# Patient Record
Sex: MALE | Race: WHITE | NOT HISPANIC OR LATINO | Employment: FULL TIME | URBAN - METROPOLITAN AREA
[De-identification: names, ages, dates, MRNs, and addresses within clinical notes are randomized per-mention and may not be internally consistent; named-entity substitution may affect disease eponyms.]

---

## 2019-09-06 ENCOUNTER — NON-PROVIDER VISIT (OUTPATIENT)
Dept: URGENT CARE | Facility: MEDICAL CENTER | Age: 29
End: 2019-09-06

## 2019-09-06 DIAGNOSIS — Z02.1 PRE-EMPLOYMENT DRUG SCREENING: ICD-10-CM

## 2019-09-06 LAB
AMP AMPHETAMINE: NORMAL
BAR BARBITURATES: NORMAL
BZO BENZODIAZEPINES: NORMAL
COC COCAINE: NORMAL
INT CON NEG: NORMAL
INT CON POS: NORMAL
MDMA ECSTASY: NORMAL
MET METHAMPHETAMINES: NORMAL
MTD METHADONE: NORMAL
OPI OPIATES: NORMAL
OXY OXYCODONE: NORMAL
PCP PHENCYCLIDINE: NORMAL
POC URINE DRUG SCREEN OCDRS: NORMAL
THC: NORMAL

## 2019-09-06 PROCEDURE — 80305 DRUG TEST PRSMV DIR OPT OBS: CPT | Performed by: PHYSICIAN ASSISTANT

## 2021-02-20 ENCOUNTER — HOSPITAL ENCOUNTER (EMERGENCY)
Facility: MEDICAL CENTER | Age: 31
End: 2021-02-21
Attending: EMERGENCY MEDICINE
Payer: COMMERCIAL

## 2021-02-20 DIAGNOSIS — K04.7 DENTAL INFECTION: ICD-10-CM

## 2021-02-20 DIAGNOSIS — K08.89 PAIN, DENTAL: ICD-10-CM

## 2021-02-20 PROCEDURE — 64400 NJX AA&/STRD TRIGEMINAL NRV: CPT

## 2021-02-20 PROCEDURE — 700102 HCHG RX REV CODE 250 W/ 637 OVERRIDE(OP): Performed by: EMERGENCY MEDICINE

## 2021-02-20 PROCEDURE — A9270 NON-COVERED ITEM OR SERVICE: HCPCS | Performed by: EMERGENCY MEDICINE

## 2021-02-20 PROCEDURE — 303977 HCHG I & D

## 2021-02-20 PROCEDURE — 99283 EMERGENCY DEPT VISIT LOW MDM: CPT

## 2021-02-20 RX ORDER — AMOXICILLIN 500 MG/1
500 CAPSULE ORAL 3 TIMES DAILY
Qty: 21 CAPSULE | Refills: 0 | Status: SHIPPED | OUTPATIENT
Start: 2021-02-20 | End: 2021-02-27

## 2021-02-20 RX ORDER — BUPIVACAINE HYDROCHLORIDE AND EPINEPHRINE 5; 5 MG/ML; UG/ML
10 INJECTION, SOLUTION EPIDURAL; INTRACAUDAL; PERINEURAL ONCE
Status: COMPLETED | OUTPATIENT
Start: 2021-02-20 | End: 2021-02-21

## 2021-02-20 RX ORDER — AMOXICILLIN 500 MG/1
500 CAPSULE ORAL ONCE
Status: COMPLETED | OUTPATIENT
Start: 2021-02-20 | End: 2021-02-20

## 2021-02-20 RX ADMIN — AMOXICILLIN 500 MG: 500 CAPSULE ORAL at 23:43

## 2021-02-21 ENCOUNTER — HOSPITAL ENCOUNTER (EMERGENCY)
Facility: MEDICAL CENTER | Age: 31
End: 2021-02-21
Attending: EMERGENCY MEDICINE
Payer: COMMERCIAL

## 2021-02-21 VITALS
HEIGHT: 75 IN | HEART RATE: 79 BPM | OXYGEN SATURATION: 99 % | DIASTOLIC BLOOD PRESSURE: 79 MMHG | WEIGHT: 156.53 LBS | RESPIRATION RATE: 16 BRPM | BODY MASS INDEX: 19.46 KG/M2 | TEMPERATURE: 98 F | SYSTOLIC BLOOD PRESSURE: 134 MMHG

## 2021-02-21 VITALS
RESPIRATION RATE: 16 BRPM | OXYGEN SATURATION: 97 % | DIASTOLIC BLOOD PRESSURE: 67 MMHG | WEIGHT: 156.53 LBS | HEART RATE: 83 BPM | BODY MASS INDEX: 19.46 KG/M2 | HEIGHT: 75 IN | SYSTOLIC BLOOD PRESSURE: 130 MMHG | TEMPERATURE: 97.6 F

## 2021-02-21 DIAGNOSIS — K02.9 INFECTED DENTAL CARIES: ICD-10-CM

## 2021-02-21 DIAGNOSIS — K04.7 DENTAL ABSCESS: ICD-10-CM

## 2021-02-21 DIAGNOSIS — K04.7 INFECTED DENTAL CARIES: ICD-10-CM

## 2021-02-21 PROCEDURE — 96372 THER/PROPH/DIAG INJ SC/IM: CPT

## 2021-02-21 PROCEDURE — A9270 NON-COVERED ITEM OR SERVICE: HCPCS | Performed by: EMERGENCY MEDICINE

## 2021-02-21 PROCEDURE — 700102 HCHG RX REV CODE 250 W/ 637 OVERRIDE(OP): Performed by: EMERGENCY MEDICINE

## 2021-02-21 PROCEDURE — 99283 EMERGENCY DEPT VISIT LOW MDM: CPT

## 2021-02-21 PROCEDURE — 700111 HCHG RX REV CODE 636 W/ 250 OVERRIDE (IP): Performed by: EMERGENCY MEDICINE

## 2021-02-21 PROCEDURE — 700101 HCHG RX REV CODE 250: Performed by: EMERGENCY MEDICINE

## 2021-02-21 RX ORDER — IBUPROFEN 600 MG/1
600 TABLET ORAL EVERY 6 HOURS PRN
Qty: 20 TABLET | Refills: 0 | Status: SHIPPED | OUTPATIENT
Start: 2021-02-21

## 2021-02-21 RX ORDER — AMOXICILLIN AND CLAVULANATE POTASSIUM 875; 125 MG/1; MG/1
1 TABLET, FILM COATED ORAL ONCE
Status: COMPLETED | OUTPATIENT
Start: 2021-02-21 | End: 2021-02-21

## 2021-02-21 RX ORDER — KETOROLAC TROMETHAMINE 30 MG/ML
30 INJECTION, SOLUTION INTRAMUSCULAR; INTRAVENOUS ONCE
Status: COMPLETED | OUTPATIENT
Start: 2021-02-21 | End: 2021-02-21

## 2021-02-21 RX ORDER — AMOXICILLIN AND CLAVULANATE POTASSIUM 875; 125 MG/1; MG/1
1 TABLET, FILM COATED ORAL 2 TIMES DAILY
Qty: 14 TABLET | Refills: 0 | Status: SHIPPED | OUTPATIENT
Start: 2021-02-21 | End: 2021-02-28

## 2021-02-21 RX ADMIN — AMOXICILLIN AND CLAVULANATE POTASSIUM 1 TABLET: 875; 125 TABLET, FILM COATED ORAL at 07:58

## 2021-02-21 RX ADMIN — BUPIVACAINE HYDROCHLORIDE AND EPINEPHRINE 10 ML: 5; 5 INJECTION, SOLUTION EPIDURAL; INTRACAUDAL; PERINEURAL at 00:17

## 2021-02-21 RX ADMIN — KETOROLAC TROMETHAMINE 30 MG: 30 INJECTION, SOLUTION INTRAMUSCULAR; INTRAVENOUS at 07:58

## 2021-02-21 NOTE — DISCHARGE INSTRUCTIONS
You were seen in the emergency department for dental pain.  There is concern for possible infection and you are being started on an antibiotic.  You have also been given an injection of numbing medicine that should numb your mouth for 12 to 24 hours.    For pain you can take ibuprofen (Motrin), 600mg every 6 hours as needed for pain (take with food to avoid GI upset). You can also take acetaminophen (Tylenol), 1000mg every 8 hours as needed for pain. Do not take more than 3000mg of acetaminophen in any 24 hour period.  Taking these medications regularly during the day can be very effective in controlling pain.    Please follow-up with your dentist.    Please return to the emergency department or seek medical attention if you develop:  Difficulty swallowing, difficulty breathing, fevers, any other new or concerning findings

## 2021-02-21 NOTE — DISCHARGE INSTRUCTIONS
Follow-up with dental/oral surgery as scheduled Monday and/or Tuesday for reevaluation and definitive treatment.  Above is an additional referral option, call Dr. Serrano's office tomorrow morning, references emergency department visit and schedule appointment for follow-up.    Stop taking amoxicillin.  Start Augmentin twice daily for 7 days or until seen by dentist.  Motrin every 6 hours as needed for swelling or discomfort.  Encourage around-the-clock use for at least 48 hours.  You may add Tylenol every 4-6 hours as needed for breakthrough pain.    Room temperature soft diet as tolerated.    Return to the emergency department for intractable pain, worsening facial swelling or redness, difficulty swallowing or breathing, voice change, neck pain or stiffness, fever, vomiting or other new concerns.

## 2021-02-21 NOTE — ED NOTES
Patient walked with a steady gate at this time to [facility] room 36. Offered him a blanket (declined at this time) and call light. Ready to be seen  rn at bedside

## 2021-02-21 NOTE — ED TRIAGE NOTES
Ambulatory to triage with report of  Chief Complaint   Patient presents with   • Dental Injury     right lower jaw, broken tooth.  has been broken for a while, pain and swelling started yesterday.  reports no relief with ibuprofen.  has appt with dentist on Tuesday   no drooling noted in triage, voice slightly garbled, slight swelling noted to right lower jaw.  Denies fever

## 2021-02-21 NOTE — ED PROVIDER NOTES
ED Provider Note    Scribed for Marcelino Kelley M.D. by Ting Antunez. 2/20/2021, 11:13 PM.    Means of Arrival: Walk-in  History obtained from: Patient  History limited by: None    CHIEF COMPLAINT  Chief Complaint   Patient presents with    Dental Injury     right lower jaw, broken tooth.  has been broken for a while, pain and swelling started yesterday.  reports no relief with ibuprofen.  has appt with dentist on Tuesday     HPI  Leonard Rojas is a 30 y.o. male who presents to the Emergency Department for evaluation of right lower jaw pain. The patient reports having a broken tooth in the right lower jaw that required filling. Patient reports the filling fell out of the tooth three years ago. He has been able to relieve the intermittent pain with Ibuprofen since then. However, the patient reports no relief with Ibuprofen with the pain started yesterday. He states the pain is exacerbated with eating. The patient has an appointment scheduled with a dentist in three days but is requesting pain relief in the meantime. Denies fevers. The patient has no allergies to medications. Denies having any chronic medical problems.    REVIEW OF SYSTEMS  HENT: Right lower jaw pain.     See HPI for further details.     PAST MEDICAL HISTORY  History reviewed. No pertinent past medical history.    FAMILY HISTORY  History reviewed. No pertinent family history.    SOCIAL HISTORY   reports that he has been smoking cigarettes. He has been smoking about 1.00 pack per day. He has never used smokeless tobacco. He reports that he does not drink alcohol and does not use drugs.    SURGICAL HISTORY  History reviewed. No pertinent surgical history.    CURRENT MEDICATIONS  Home Medications       Reviewed by Hui Fields R.N. (Registered Nurse) on 02/20/21 at 8181  Med List Status: <None>     Medication Last Dose Status        Patient Mark Taking any Medications                         ALLERGIES  No Known  "Allergies    PHYSICAL EXAM  VITAL SIGNS: /75   Pulse 70   Temp 36.2 °C (97.2 °F) (Temporal)   Resp 14   Ht 1.905 m (6' 3\")   Wt 71 kg (156 lb 8.4 oz)   SpO2 98%   BMI 19.56 kg/m²    Gen: alert, no acute distress  HENT: ATNC. Tooth 30 is broken. Swelling adjacent to the tooth with erythema. Poor dentition of tooth 30. No obvious discharge. Soft sublingual space. No stridor. Patient is managing secretions well.   Eyes: normal conjuctiva.  Resp: No resipiratory distress.   CV: No JVD.  Abd: Non-distended.  Extremities: No deformity.    PROCEDURES     Inferior Alveolar Block:  11:26 PM performed by Dr. Kelley  Sterile process was used  Indication: dental pain  Consent: verbal from the patient  Location: tooth 30  Anesthesia: 0.5% bupivacaine with epi 3 cc's  Toleration: the patient tolerated well, no immediate complications or bleeding  Total procedure time: 7 minutes    COURSE & MEDICAL DECISION MAKING  Pertinent Labs & Imaging studies reviewed. (See chart for details)    11:13 PM - Patient evaluated at bedside. Patient will be medicated with Amoxicillin 500 mg capsule and Bupivacaine 0.5 % 10 mL injection for his symptoms.    11:50 PM - I performed an inferior alveolar block at this time.     12:04 AM - Patient reevaluated at bedside. Attempted incision and drainage of the abscess, but patient could not tolerate the procedure at this time.     12:10 AM - Instructed the patient to use a combination of Tylenol and Motrin for the pain at home once the Bupivacaine injection wears off. Patient was given an opportunity to ask questions at this time.      Medical Decision Making:  Patient presents with dental pain and swelling from a previously injured tooth.  He was treated with a dental block, and I&D was attempted, however patient did not tolerate this.  He preferred to proceed with antibiotics only and not repeat attempted I&D.  He was counseled on nonopioid pain medications, and has a dental follow-up " arranged.  No evidence of facial cellulitis, Lemierre's syndrome, Niraj angina.    I wore a mask and eye protection throughout the encounter.    The patient will return for new or worsening symptoms and is stable at the time of discharge.    The patient is referred to a primary physician for blood pressure management, diabetic screening, and for all other preventative health concerns.    DISPOSITION:  Patient will be discharged home in stable condition.    FOLLOW UP:  Your dentist    In 3 days    OUTPATIENT MEDICATIONS:  New Prescriptions    AMOXICILLIN (AMOXIL) 500 MG CAP    Take 1 capsule by mouth 3 times a day for 7 days.     FINAL IMPRESSION  1. Pain, dental    2. Dental infection      Inferior alveolar block performed by ERP, as outlined above.     Ting WATERS (Zane), am scribing for, and in the presence of, Marcelino Kelley M.D..    Electronically signed by: Ting Antunez (Zane), 2/20/2021    Marcelino WATERS M.D. personally performed the services described in this documentation, as scribed by Ting Antunez in my presence, and it is both accurate and complete.    E.    The note accurately reflects work and decisions made by me.  Marcelino Kelley M.D.  2/21/2021  12:29 AM

## 2021-02-21 NOTE — ED PROVIDER NOTES
"ED Provider Note    CHIEF COMPLAINT  Chief Complaint   Patient presents with   • Facial Swelling       HPI  Leonard Marcelo Rojas is a 30 y.o. male who presents to the emergency department with significant other for dental pain and facial swelling.  Patient with chronic poor dentition, history of broken right lower molar previously treated with filling, pain intermittent for years, but worse over the last week.  Nearly intractable the last couple of days.  Seen at this facility yesterday and given a dental block and started on antibiotics.  He has an appointment with dentist both Monday and Tuesday(1 and 2 days away).  Pain worse with mastication.  No fever.  No difficulty swallowing.  No neck pain or stiffness.    Patient took amoxicillin last night and again this morning.  Still without significant discomfort due to dental block.  However patient has noticed some increased swelling and is worried that \"when the pain comes back it is going to be bad.\"    REVIEW OF SYSTEMS  See HPI for further details. All other systems are negative.     PAST MEDICAL HISTORY   Denies    SOCIAL HISTORY  Social History     Tobacco Use   • Smoking status: Current Every Day Smoker     Packs/day: 1.00     Types: Cigarettes   • Smokeless tobacco: Never Used   Substance and Sexual Activity   • Alcohol use: Never   • Drug use: Never   • Sexual activity: Not on file       SURGICAL HISTORY  patient denies any surgical history    CURRENT MEDICATIONS  Home Medications    **Home medications have not yet been reviewed for this encounter**         ALLERGIES  No Known Allergies    PHYSICAL EXAM  VITAL SIGNS: /67   Pulse 83   Temp 36.4 °C (97.6 °F) (Temporal)   Resp 16   Ht 1.905 m (6' 3\")   Wt 71 kg (156 lb 8.4 oz)   SpO2 97%   BMI 19.56 kg/m²   Pulse ox interpretation: I interpret this pulse ox as normal.  Constitutional: Alert in no apparent distress.  HENT: Normocephalic, atraumatic. Bilateral external ears normal, Nose normal. " Moist mucous membranes.  Mild swelling over right mandibular region just anterior to the angle.  No erythema, warmth, crepitus, fluctuance.  Tooth #30 broken at the gumline, tender percussion, necrotic.  Mild gingival swelling without fluctuant pocket or obvious abscess.  No lingual elevation.  Uvula remains midline.  Tolerating secretions.  No stridor or dysphonia.  Eyes: Conjunctiva normal.   Neck: Normal range of motion, Supple.  No meningeal irritation.  Lymphatic: No submandibular lymphadenopathy.  Cardiovascular: Normal peripheral perfusion.  Thorax & Lungs: Nonlabored respirations.  Skin: Warm, Dry  Musculoskeletal: Good range of motion in all major joints.   Neurologic: Alert and oriented x4.  Ambulates independently.  Psychiatric: Affect normal, Judgment normal, Mood normal.       COURSE & MEDICAL DECISION MAKING  Nursing notes and vital signs were reviewed. (See chart for details)  The patients records were reviewed, history was obtained from the patient and a friend;    Medical record review: Patient was seen yesterday evening 2/20/2021 for the same.  Given inferior alveolar block with bupivacaine plus epinephrine.  Presumed abscess attempted I&D but patient did not tolerate well.  Started on amoxicillin.  Discharged to follow-up with dental/oral surgery.    ED evaluation most consistent with infected dental caries, suspect apical dental abscess.  Patient has mild swelling to the right face, tenderness to palpation over the mandible region.  Note visualized fluctuant gingival pocket accessible for I&D at this time.  No sublingual elevation.  No clinical evidence for Cherri's, facial cellulitis or abscess, meningitis.  Hemodynamically stable without fever or tachycardia.  Tolerates oral medications without difficulty.  The pain was was controlled at my initial evaluation from the block last night, before discharge he is quite concerned that the pain is returning.  He did receive Toradol IM, will switch to  Augmentin.  He has appointments each of the next 2 days with dentist, he is also been given oral surgery referral (Dr. Serrano).     Patient is stable for discharge at this time, anticipatory guidance provided, switch to Augmentin, alternate Tylenol and Motrin, close follow-up is encouraged, and strict ED return instructions have been detailed. Patient is agreeable to the disposition and plan.    Patient's blood pressure was elevated in the emergency department, and has been referred to primary care for close monitoring.      FINAL IMPRESSION  (K02.9,  K04.7) Infected dental caries  (K04.7) Dental abscess      Electronically signed by: Lakesha Dubose D.O., 2/21/2021 8:04 AM      This dictation was created using voice recognition software. The accuracy of the dictation is limited to the abilities of the software. I expect there may be some errors of grammar and possibly content. The nursing notes were reviewed and certain aspects of this information were incorporated into this note.

## 2021-02-21 NOTE — ED TRIAGE NOTES
"Chief Complaint   Patient presents with   • Facial Swelling     Pt walked in for above complaint. Patient was seen here around 2200 for Rt lower jaw pain and had dental block placed. Patient now having swelling on Rt side. Pt denies any pain but states he feels his face throbbing and is concerned about when the pain will return once the block wears off. Pt denies any difficulty swallowing or breathing. Patient placed back in the lobby and educated to alert staff of any changes or concerns.    /89   Pulse (!) 103   Temp 36.3 °C (97.4 °F) (Temporal)   Resp 16   Ht 1.905 m (6' 3\")   Wt 71 kg (156 lb 8.4 oz)   SpO2 98%   BMI 19.56 kg/m²       "